# Patient Record
Sex: FEMALE | URBAN - METROPOLITAN AREA
[De-identification: names, ages, dates, MRNs, and addresses within clinical notes are randomized per-mention and may not be internally consistent; named-entity substitution may affect disease eponyms.]

---

## 2017-02-24 ENCOUNTER — THERAPY VISIT (OUTPATIENT)
Dept: PHYSICAL THERAPY | Facility: CLINIC | Age: 57
End: 2017-02-24
Payer: COMMERCIAL

## 2017-02-24 DIAGNOSIS — M25.511 SHOULDER PAIN, RIGHT: ICD-10-CM

## 2017-02-24 DIAGNOSIS — Z98.890 S/P ROTATOR CUFF REPAIR: Primary | ICD-10-CM

## 2017-02-24 PROCEDURE — 97161 PT EVAL LOW COMPLEX 20 MIN: CPT | Mod: GP | Performed by: PHYSICAL THERAPIST

## 2017-02-24 PROCEDURE — 97110 THERAPEUTIC EXERCISES: CPT | Mod: GP | Performed by: PHYSICAL THERAPIST

## 2017-02-24 NOTE — PROGRESS NOTES
[place] [facility] Initial Evaluation    Subjective:    Migue Cerna is a 57 year old female with a right shoulder condition.  Condition occurred with:  Lifting.  Condition occurred: during recreation/sport.  This is a new condition  Patient underwent a R RCR, SAD, DCE performed by Dr. Marshall at  on 1/20/2017. Patient is currently wearing her sling and has been able to sleep in bed with the sling. Has weaned from her pain meds and is using IBprofen for pain. The patient reports she tore her rotator cuff performing a TRX push-up nearly a year ago. States her goal is to bootcamp style classes..    Patient reports pain:  Anterior and lateral.  Radiates to:  Upper arm.  Pain is described as aching and is intermittent and reported as 4/10.  Associated symptoms:  Catching, loss of strength and loss of motion/stiffness. Pain is the same all the time.  Symptoms are exacerbated by other (accidently using arm while getting dressed/ready) and relieved by ice, NSAID's and analgesics.  Since onset symptoms are gradually improving.        General health as reported by patient is good.  Pertinent medical history includes:  Overweight and sleep disorder/apnea.  Medical allergies: no.  Other surgeries include:  Orthopedic surgery (L RCR).  Current medications:  Pain medication.  Current occupation is .    Primary job tasks include:  Prolonged sitting, repetitive tasks and other (computer work).                              Objective:    System                   Shoulder Evaluation:  ROM:  AROM:  : L shoulder AROM: normal, R AROM not tested today                             PROM:    Flexion:  Right: 90      Abduction:  Right:  60    Internal Rotation:  Right:  Wfl @ 55* abd  External Rotation:  Right:  20* @ 55* abd                    Strength:  : R shoulder strength not tested today.  Flexion: Left:5/5   Pain:      Extension:  Left: 5/5    Pain:      Abduction:  Left: 5/5  Pain:         Internal Rotation:  Left:5/5     Pain:      External Rotation:   Left:5/5     Pain:                                                      General     ROS    Assessment/Plan:      Patient is a 57 year old female with right side shoulder complaints.    Patient has the following significant findings with corresponding treatment plan.                Diagnosis 1:  S/p R RCR, DCE, SAD (DOS 1/20/2017)  Pain -  hot/cold therapy, manual therapy, education and home program  Decreased ROM/flexibility - manual therapy and therapeutic exercise  Decreased strength - therapeutic exercise and therapeutic activities  Impaired muscle performance - neuro re-education  Decreased function - therapeutic activities    Therapy Evaluation Codes:   1) History comprised of:   Personal factors that impact the plan of care:      Profession and 2 week delay in beginning PT.    Comorbidity factors that impact the plan of care are:      Overweight and Sleep disorder/apnea.     Medications impacting care: Pain.  2) Examination of Body Systems comprised of:   Body structures and functions that impact the plan of care:      Shoulder.   Activity limitations that impact the plan of care are:      Bathing, Cooking, Driving, Dressing, Lifting, Reading/Computer work and Laying down.  3) Clinical presentation characteristics are:   Stable/Uncomplicated.  4) Decision-Making    Low complexity using standardized patient assessment instrument and/or measureable assessment of functional outcome.  Cumulative Therapy Evaluation is: Low complexity.    Previous and current functional limitations:  (See Goal Flow Sheet for this information)    Short term and Long term goals: (See Goal Flow Sheet for this information)     Communication ability:  Patient appears to be able to clearly communicate and understand verbal and written communication and follow directions correctly.  Treatment Explanation - The following has been discussed with the patient:   RX  ordered/plan of care  Anticipated outcomes  Possible risks and side effects  This patient would benefit from PT intervention to resume normal activities.   Rehab potential is good.    Frequency:  2 X week, once daily  Duration:  for 6 weeks tapering to 1 X a week over 6 weeks  Discharge Plan:  Achieve all LTG.  Independent in home treatment program.  Reach maximal therapeutic benefit.    Please refer to the daily flowsheet for treatment today, total treatment time and time spent performing 1:1 timed codes.

## 2017-02-24 NOTE — LETTER
[place] [facility]  [address]  [address]  [place] [address]  [phone]    2017    Re: Migue Cerna   :   1960  MRN:  [id number]   REFERRING PHYSICIAN:   Ryne Marshall DO    [facility]  Date of Initial Evaluation:  17  Visits:  Rxs Used: 1  Reason for Referral:     S/P rotator cuff repair  Shoulder pain, right    EVALUATION SUMMARY  [facility] Initial Evaluation  Subjective:  Migue Cerna is a 57 year old female with a right shoulder condition.  Condition occurred with:  Lifting.  Condition occurred: during recreation/sport.  This is a new condition  Patient underwent a R RCR, SAD, DCE performed by Dr. Marshall at  on 2017. Patient is currently wearing her sling and has been able to sleep in bed with the sling. Has weaned from her pain meds and is using IBprofen for pain. The patient reports she tore her rotator cuff performing a TRX push-up nearly a year ago. States her goal is to bootcamp style classes..    Patient reports pain:  Anterior and lateral.  Radiates to:  Upper arm.  Pain is described as aching and is intermittent and reported as 4/10.  Associated symptoms:  Catching, loss of strength and loss of motion/stiffness. Pain is the same all the time.  Symptoms are exacerbated by other (accidently using arm while getting dressed/ready) and relieved by ice, NSAID's and analgesics.  Since onset symptoms are gradually improving.        General health as reported by patient is good.  Pertinent medical history includes:  Overweight and sleep disorder/apnea.  Medical allergies: no.  Other surgeries include:  Orthopedic surgery (L RCR).  Current medications:  Pain medication.  Current occupation is .    Primary job tasks include:  Prolonged sitting, repetitive tasks and other (computer work).       Shoulder Evaluation:  ROM:  AROM:  : L shoulder AROM: normal, R  AROM not tested today      PROM:    Flexion:  Right: 90    Abduction:  Right:  60  Internal Rotation:  Right:  Wfl @ 55* abd  External Rotation:  Right:  20* @ 55* abd      Re: Migue Cerna   :   1960    Strength:  : R shoulder strength not tested today.  Flexion: Left:5/5   Pain:      Extension:  Left: 5/5    Pain:      Abduction:  Left: 5/5  Pain:      Internal Rotation:  Left:5/5     Pain:      External Rotation:   Left:5/5     Pain:       Assessment/Plan:    Patient is a 57 year old female with right side shoulder complaints.    Patient has the following significant findings with corresponding treatment plan.                Diagnosis 1:  S/p R RCR, DCE, SAD (DOS 2017)  Pain -  hot/cold therapy, manual therapy, education and home program  Decreased ROM/flexibility - manual therapy and therapeutic exercise  Decreased strength - therapeutic exercise and therapeutic activities  Impaired muscle performance - neuro re-education  Decreased function - therapeutic activities    Therapy Evaluation Codes:   1) History comprised of:   Personal factors that impact the plan of care:      Profession and 2 week delay in beginning PT.    Comorbidity factors that impact the plan of care are:      Overweight and Sleep disorder/apnea.     Medications impacting care: Pain.  2) Examination of Body Systems comprised of:   Body structures and functions that impact the plan of care:      Shoulder.   Activity limitations that impact the plan of care are:      Bathing, Cooking, Driving, Dressing, Lifting, Reading/Computer work and Laying down.  3) Clinical presentation characteristics are:   Stable/Uncomplicated.  4) Decision-Making    Low complexity using standardized patient assessment instrument and/or measureable assessment of functional outcome.  Cumulative Therapy Evaluation is: Low complexity.  Previous and current functional limitations:  (See Goal Flow Sheet for this information)    Short term and Long term goals:  (See Goal Flow Sheet for this information)   Communication ability:  Patient appears to be able to clearly communicate and understand verbal and written communication and follow directions correctly.  Treatment Explanation - The following has been discussed with the patient:   RX ordered/plan of care  Anticipated outcomes  Possible risks and side effects  This patient would benefit from PT intervention to resume normal activities.   Rehab potential is good.    Re: Migue Cenra   :   1960    Frequency:  2 X week, once daily  Duration:  for 6 weeks tapering to 1 X a week over 6 weeks  Discharge Plan:  Achieve all LTG.  Independent in home treatment program.  Reach maximal therapeutic benefit.  Please refer to the daily flowsheet for treatment today, total treatment time and time spent performing 1:1 timed codes.     Thank you for your referral.    INQUIRIES  Therapist: Antonia Cristobal   [facility]  [address] [place] [id number]  Phone: [phone]  Fax: [phone]

## 2017-03-03 ENCOUNTER — THERAPY VISIT (OUTPATIENT)
Dept: PHYSICAL THERAPY | Facility: CLINIC | Age: 57
End: 2017-03-03
Payer: COMMERCIAL

## 2017-03-03 DIAGNOSIS — M25.511 SHOULDER PAIN, RIGHT: ICD-10-CM

## 2017-03-03 DIAGNOSIS — Z98.890 S/P ROTATOR CUFF REPAIR: ICD-10-CM

## 2017-03-03 PROCEDURE — 97110 THERAPEUTIC EXERCISES: CPT | Mod: GP | Performed by: PHYSICAL THERAPIST

## 2017-03-06 ENCOUNTER — THERAPY VISIT (OUTPATIENT)
Dept: PHYSICAL THERAPY | Facility: CLINIC | Age: 57
End: 2017-03-06
Payer: COMMERCIAL

## 2017-03-06 DIAGNOSIS — Z98.890 S/P ROTATOR CUFF REPAIR: ICD-10-CM

## 2017-03-06 DIAGNOSIS — M25.511 SHOULDER PAIN, RIGHT: ICD-10-CM

## 2017-03-06 PROCEDURE — 97110 THERAPEUTIC EXERCISES: CPT | Mod: GP | Performed by: PHYSICAL THERAPIST

## 2017-03-10 ENCOUNTER — THERAPY VISIT (OUTPATIENT)
Dept: PHYSICAL THERAPY | Facility: CLINIC | Age: 57
End: 2017-03-10
Payer: COMMERCIAL

## 2017-03-10 DIAGNOSIS — Z98.890 S/P ROTATOR CUFF REPAIR: ICD-10-CM

## 2017-03-10 DIAGNOSIS — M25.511 SHOULDER PAIN, RIGHT: ICD-10-CM

## 2017-03-10 PROCEDURE — 97110 THERAPEUTIC EXERCISES: CPT | Mod: GP | Performed by: PHYSICAL THERAPIST

## 2017-03-10 NOTE — PROGRESS NOTES
Subjective:    HPI                    Objective:    System    Physical Exam    General     ROS    Assessment/Plan:      PROGRESS  REPORT    Progress reporting period is from 2/24/2017 to 3/10/2017.     4 physical therapy sessions.    SUBJECTIVE  Subjective changes noted by patient: Patient states her arm is a little sore today but she wasn't wearing her sling all day. Got the pulleys on Monday and has been working on them, feels the motion is improving. The other exercises have been going good. Has a follow up with the surgeon on Tuesday.    Current pain level is 7/10.     Previous pain level was  4/10.   Changes in function:  Yes (See Goal flowsheet attached for changes in current functional level)  Adverse reaction to treatment or activity: None    OBJECTIVE  Changes noted in objective findings:  Yes,   Objective:   AROM R Shldr Flx: 122, Abd: 101,   PROM R Shldr Flx: 138, Abd: 125, ER: 65@ 60* abd, IR: 60* @ 60* abd     ASSESSMENT/PLAN  Updated problem list and treatment plan: Diagnosis 1:  s/p R RCR, DCE, SAD   Pain -  hot/cold therapy, manual therapy, education and home program  Decreased ROM/flexibility - manual therapy and therapeutic exercise  Decreased strength - therapeutic exercise and therapeutic activities  Impaired muscle performance - neuro re-education  Decreased function - therapeutic activities  STG/LTGs have been met or progress has been made towards goals:  Yes (See Goal flow sheet completed today.)  Assessment of Progress: The patient's condition is improving despite beginning physical therapy later than ordered.  Patient is meeting short term goals and is progressing towards long term goals.  Self Management Plans:  Patient has been instructed in a home treatment program.  I have re-evaluated this patient and find that the nature, scope, duration and intensity of the therapy is appropriate for the medical condition of the patient.  Migue continues to require the following intervention to meet  STG and LTG's:  PT    Recommendations:  This patient would benefit from continued therapy.     Frequency:  2 X week, once daily  Duration:  for 3 weeks tapering to 1 X a week over 7 weeks        Please refer to the daily flowsheet for treatment today, total treatment time and time spent performing 1:1 timed codes.

## 2017-03-10 NOTE — LETTER
[place] [facility]  [address]  [address]  [place] [address]  [phone]    2017    Re: Migue RODRIGEZ Nehemiah   :   1960  MRN:  [id number]   REFERRING PHYSICIAN:   Ryne Marshall    [place] [facility]  Date of Initial Evaluation:  17  Visits:  Rxs Used: 4  Reason for Referral:     S/P rotator cuff repair  Shoulder pain, right    PROGRESS  REPORT  Progress reporting period is from 2017 to 3/10/2017.     4 physical therapy sessions.    SUBJECTIVE  Subjective changes noted by patient: Patient states her arm is a little sore today but she wasn't wearing her sling all day. Got the pulleys on Monday and has been working on them, feels the motion is improving. The other exercises have been going good. Has a follow up with the surgeon on Tuesday.    Current pain level is 7/10.     Previous pain level was  4/10.   Changes in function:  Yes (See Goal flowsheet attached for changes in current functional level)  Adverse reaction to treatment or activity: None    OBJECTIVE  Changes noted in objective findings:  Yes,   Objective:   AROM R Shldr Flx: 122, Abd: 101,   PROM R Shldr Flx: 138, Abd: 125, ER: 65@ 60* abd, IR: 60* @ 60* abd     ASSESSMENT/PLAN  Updated problem list and treatment plan: Diagnosis 1:  s/p R RCR, DCE, SAD   Pain -  hot/cold therapy, manual therapy, education and home program  Decreased ROM/flexibility - manual therapy and therapeutic exercise  Decreased strength - therapeutic exercise and therapeutic activities  Impaired muscle performance - neuro re-education  Decreased function - therapeutic activities  STG/LTGs have been met or progress has been made towards goals:  Yes (See Goal flow sheet completed today.)        Migue Cerna         Assessment of Progress: The patient's condition is improving despite beginning physical therapy later than ordered.  Patient is meeting short term goals and is progressing towards  long term goals.  Self Management Plans:  Patient has been instructed in a home treatment program.  I have re-evaluated this patient and find that the nature, scope, duration and intensity of the therapy is appropriate for the medical condition of the patient.  Migue continues to require the following intervention to meet STG and LTG's:  PT    Recommendations:  This patient would benefit from continued therapy.     Frequency:  2 X week, once daily  Duration:  for 3 weeks tapering to 1 X a week over 7 weeks    Please refer to the daily flowsheet for treatment today, total treatment time and time spent performing 1:1 timed codes.    Thank you for your referral.    INQUIRIES  Therapist: Antonia Cristobal, PT  [facility]  [address] [place], [place] [id number]  Phone: [phone]  Fax: [phone]

## 2017-03-13 ENCOUNTER — THERAPY VISIT (OUTPATIENT)
Dept: PHYSICAL THERAPY | Facility: CLINIC | Age: 57
End: 2017-03-13
Payer: COMMERCIAL

## 2017-03-13 DIAGNOSIS — M25.511 SHOULDER PAIN, RIGHT: ICD-10-CM

## 2017-03-13 DIAGNOSIS — Z98.890 S/P ROTATOR CUFF REPAIR: ICD-10-CM

## 2017-03-13 PROCEDURE — 97110 THERAPEUTIC EXERCISES: CPT | Mod: GP | Performed by: PHYSICAL THERAPIST

## 2017-03-16 ENCOUNTER — THERAPY VISIT (OUTPATIENT)
Dept: PHYSICAL THERAPY | Facility: CLINIC | Age: 57
End: 2017-03-16
Payer: COMMERCIAL

## 2017-03-16 DIAGNOSIS — M25.511 SHOULDER PAIN, RIGHT: ICD-10-CM

## 2017-03-16 DIAGNOSIS — Z98.890 S/P ROTATOR CUFF REPAIR: ICD-10-CM

## 2017-03-16 PROCEDURE — 97110 THERAPEUTIC EXERCISES: CPT | Mod: GP | Performed by: PHYSICAL THERAPIST

## 2017-03-23 ENCOUNTER — THERAPY VISIT (OUTPATIENT)
Dept: PHYSICAL THERAPY | Facility: CLINIC | Age: 57
End: 2017-03-23
Payer: COMMERCIAL

## 2017-03-23 DIAGNOSIS — M25.511 SHOULDER PAIN, RIGHT: ICD-10-CM

## 2017-03-23 DIAGNOSIS — Z98.890 S/P ROTATOR CUFF REPAIR: ICD-10-CM

## 2017-03-23 PROCEDURE — 97110 THERAPEUTIC EXERCISES: CPT | Mod: GP | Performed by: PHYSICAL THERAPIST

## 2017-03-27 ENCOUNTER — THERAPY VISIT (OUTPATIENT)
Dept: PHYSICAL THERAPY | Facility: CLINIC | Age: 57
End: 2017-03-27
Payer: COMMERCIAL

## 2017-03-27 DIAGNOSIS — M25.511 SHOULDER PAIN, RIGHT: ICD-10-CM

## 2017-03-27 DIAGNOSIS — Z98.890 S/P ROTATOR CUFF REPAIR: ICD-10-CM

## 2017-03-27 PROCEDURE — 97110 THERAPEUTIC EXERCISES: CPT | Mod: GP | Performed by: PHYSICAL THERAPIST

## 2017-03-27 PROCEDURE — 97112 NEUROMUSCULAR REEDUCATION: CPT | Mod: GP | Performed by: PHYSICAL THERAPIST

## 2017-03-30 ENCOUNTER — THERAPY VISIT (OUTPATIENT)
Dept: PHYSICAL THERAPY | Facility: CLINIC | Age: 57
End: 2017-03-30
Payer: COMMERCIAL

## 2017-03-30 DIAGNOSIS — Z98.890 S/P ROTATOR CUFF REPAIR: ICD-10-CM

## 2017-03-30 DIAGNOSIS — M25.511 SHOULDER PAIN, RIGHT: ICD-10-CM

## 2017-03-30 PROCEDURE — 97112 NEUROMUSCULAR REEDUCATION: CPT | Mod: GP | Performed by: PHYSICAL THERAPIST

## 2017-03-30 PROCEDURE — 97110 THERAPEUTIC EXERCISES: CPT | Mod: GP | Performed by: PHYSICAL THERAPIST

## 2017-04-07 ENCOUNTER — THERAPY VISIT (OUTPATIENT)
Dept: PHYSICAL THERAPY | Facility: CLINIC | Age: 57
End: 2017-04-07
Payer: COMMERCIAL

## 2017-04-07 DIAGNOSIS — Z98.890 S/P ROTATOR CUFF REPAIR: ICD-10-CM

## 2017-04-07 DIAGNOSIS — M25.511 SHOULDER PAIN, RIGHT: ICD-10-CM

## 2017-04-07 PROCEDURE — 97110 THERAPEUTIC EXERCISES: CPT | Mod: GP | Performed by: PHYSICAL THERAPIST

## 2017-04-07 PROCEDURE — 97112 NEUROMUSCULAR REEDUCATION: CPT | Mod: GP | Performed by: PHYSICAL THERAPIST

## 2017-04-21 ENCOUNTER — THERAPY VISIT (OUTPATIENT)
Dept: PHYSICAL THERAPY | Facility: CLINIC | Age: 57
End: 2017-04-21
Payer: COMMERCIAL

## 2017-04-21 DIAGNOSIS — M25.511 SHOULDER PAIN, RIGHT: ICD-10-CM

## 2017-04-21 DIAGNOSIS — Z98.890 S/P ROTATOR CUFF REPAIR: ICD-10-CM

## 2017-04-21 PROCEDURE — 97112 NEUROMUSCULAR REEDUCATION: CPT | Mod: GP | Performed by: PHYSICAL THERAPIST

## 2017-04-21 PROCEDURE — 97110 THERAPEUTIC EXERCISES: CPT | Mod: GP | Performed by: PHYSICAL THERAPIST

## 2017-04-28 ENCOUNTER — THERAPY VISIT (OUTPATIENT)
Dept: PHYSICAL THERAPY | Facility: CLINIC | Age: 57
End: 2017-04-28
Payer: COMMERCIAL

## 2017-04-28 DIAGNOSIS — M25.511 SHOULDER PAIN, RIGHT: ICD-10-CM

## 2017-04-28 DIAGNOSIS — Z98.890 S/P ROTATOR CUFF REPAIR: ICD-10-CM

## 2017-04-28 PROCEDURE — 97110 THERAPEUTIC EXERCISES: CPT | Mod: GP | Performed by: PHYSICAL THERAPIST

## 2017-04-28 PROCEDURE — 97112 NEUROMUSCULAR REEDUCATION: CPT | Mod: GP | Performed by: PHYSICAL THERAPIST

## 2017-04-28 NOTE — LETTER
[place] [facility]  [address]  [address]  [place] [address]  [phone]    May 1, 2017    Re: Migue Cerna   :   1960  MRN:  [id number]   REFERRING PHYSICIAN:   Ryne Marshall    [place] [facility]    Date of Initial Evaluation:  2017  Visits:  Rxs Used: 12  Reason for Referral:     S/P rotator cuff repair  Shoulder pain, right      PROGRESS  REPORT  Progress reporting period is from 3/10/2017 to 2017.       SUBJECTIVE  Subjective changes noted by patient:  Patient states her R shoulder has been more achey/sore since beginning the strengthening exercises, this is a different kind of sore, more of a muscle fatigue. Feels the motion has been good and the arm feels stronger, able to open doors easier now.  Current pain level is 2/10.   Previous pain level was  4/10. Changes in function:  Yes (See Goal flowsheet attached for changes in current functional level).  Adverse reaction to treatment or activity: None    OBJECTIVE  Changes noted in objective findings:  Yes,   Objective:   AROM R Shldr Flx: 162, Abd: 164, Flx/ER: T4, Ext/IR: T8;   Strength R Shldr Flx: 4/5, Abd: -4/5, Ext: 5/5, IR: 5/5, ER: 4/5     ASSESSMENT/PLAN  Updated problem list and treatment plan: Diagnosis 1:  s/p R RCR, DCE, SAD   Pain -  hot/cold therapy, manual therapy, education and home program  Decreased ROM/flexibility - manual therapy and therapeutic exercise  Decreased strength - therapeutic exercise and therapeutic activities  Impaired muscle performance - neuro re-education  Decreased function - therapeutic activities  STG/LTGs have been met or progress has been made towards goals:  Yes (See Goal flow sheet completed today.)  Assessment of Progress: The patient's condition is improving.  The patient's condition has potential to improve.  Patient is meeting short term goals and is progressing towards long term goals.  Self Management Plans:  Patient has been  instructed in a home treatment program.  I have re-evaluated this patient and find that the nature, scope, duration and intensity of the therapy is appropriate for the medical condition of the patient.  Migue continues to require the following intervention to meet STG and LTG's:  PT    Recommendations:  This patient would benefit from continued therapy.     Frequency:  1 X week, once daily  Duration:  for 2 weeks tapering to 2 X a month over 4 weeks    Thank you for your referral.    INQUIRIES  Therapist: Antonia Cristobal PT  [facility]  [address] [place] [address]  Phone: [phone]  Fax: [phone]

## 2017-04-28 NOTE — PROGRESS NOTES
Subjective:    HPI                    Objective:    System    Physical Exam    General     ROS    Assessment/Plan:      PROGRESS  REPORT    Progress reporting period is from 3/10/2017 to 4/28/2017.       SUBJECTIVE  Subjective changes noted by patient:  Patient states her R shoulder has been more achey/sore since beginning the strengthening exercises, this is a different kind of sore, more of a muscle fatigue. Feels the motion has been good and the arm feels stronger, able to open doors easier now.    Current pain level is 2/10.     Previous pain level was  4/10.   Changes in function:  Yes (See Goal flowsheet attached for changes in current functional level)  Adverse reaction to treatment or activity: None    OBJECTIVE  Changes noted in objective findings:  Yes,   Objective:   AROM R Shldr Flx: 162, Abd: 164, Flx/ER: T4, Ext/IR: T8;   Strength R Shldr Flx: 4/5, Abd: -4/5, Ext: 5/5, IR: 5/5, ER: 4/5     ASSESSMENT/PLAN  Updated problem list and treatment plan: Diagnosis 1:  s/p R RCR, DCE, SAD   Pain -  hot/cold therapy, manual therapy, education and home program  Decreased ROM/flexibility - manual therapy and therapeutic exercise  Decreased strength - therapeutic exercise and therapeutic activities  Impaired muscle performance - neuro re-education  Decreased function - therapeutic activities  STG/LTGs have been met or progress has been made towards goals:  Yes (See Goal flow sheet completed today.)  Assessment of Progress: The patient's condition is improving.  The patient's condition has potential to improve.  Patient is meeting short term goals and is progressing towards long term goals.  Self Management Plans:  Patient has been instructed in a home treatment program.  I have re-evaluated this patient and find that the nature, scope, duration and intensity of the therapy is appropriate for the medical condition of the patient.  Migue continues to require the following intervention to meet STG and LTG's:   PT    Recommendations:  This patient would benefit from continued therapy.     Frequency:  1 X week, once daily  Duration:  for 2 weeks tapering to 2 X a month over 4 weeks        Please refer to the daily flowsheet for treatment today, total treatment time and time spent performing 1:1 timed codes.

## 2017-05-12 ENCOUNTER — THERAPY VISIT (OUTPATIENT)
Dept: PHYSICAL THERAPY | Facility: CLINIC | Age: 57
End: 2017-05-12
Payer: COMMERCIAL

## 2017-05-12 DIAGNOSIS — M25.511 SHOULDER PAIN, RIGHT: ICD-10-CM

## 2017-05-12 DIAGNOSIS — Z98.890 S/P ROTATOR CUFF REPAIR: ICD-10-CM

## 2017-05-12 PROCEDURE — 97112 NEUROMUSCULAR REEDUCATION: CPT | Mod: GP | Performed by: PHYSICAL THERAPIST

## 2017-05-12 PROCEDURE — 97110 THERAPEUTIC EXERCISES: CPT | Mod: GP | Performed by: PHYSICAL THERAPIST

## 2017-05-19 ENCOUNTER — THERAPY VISIT (OUTPATIENT)
Dept: PHYSICAL THERAPY | Facility: CLINIC | Age: 57
End: 2017-05-19
Payer: COMMERCIAL

## 2017-05-19 DIAGNOSIS — Z98.890 S/P ROTATOR CUFF REPAIR: ICD-10-CM

## 2017-05-19 DIAGNOSIS — M25.511 SHOULDER PAIN, RIGHT: ICD-10-CM

## 2017-05-19 PROCEDURE — 97112 NEUROMUSCULAR REEDUCATION: CPT | Mod: GP | Performed by: PHYSICAL THERAPIST

## 2017-05-19 PROCEDURE — 97110 THERAPEUTIC EXERCISES: CPT | Mod: GP | Performed by: PHYSICAL THERAPIST

## 2017-06-30 ENCOUNTER — THERAPY VISIT (OUTPATIENT)
Dept: PHYSICAL THERAPY | Facility: CLINIC | Age: 57
End: 2017-06-30
Payer: COMMERCIAL

## 2017-06-30 DIAGNOSIS — Z98.890 S/P ROTATOR CUFF REPAIR: ICD-10-CM

## 2017-06-30 DIAGNOSIS — M25.511 SHOULDER PAIN, RIGHT: ICD-10-CM

## 2017-06-30 PROCEDURE — 97110 THERAPEUTIC EXERCISES: CPT | Mod: GP | Performed by: PHYSICAL THERAPIST

## 2017-06-30 PROCEDURE — 97112 NEUROMUSCULAR REEDUCATION: CPT | Mod: GP | Performed by: PHYSICAL THERAPIST

## 2017-06-30 NOTE — LETTER
[place] [facility]  [address]  [address]  [place] [address]  [phone]    2017    Re: Migue Cerna   :   1960  MRN:  [id number]   REFERRING PHYSICIAN:   Ryne Marshall    [place] [facility]    Date of Initial Evaluation:  17  Visits:  Rxs Used: 15  Reason for Referral:     S/P rotator cuff repair  Shoulder pain, right    PROGRESS  REPORT  Progress reporting period is from 2017 to 2017.       SUBJECTIVE  Subjective changes noted by patient:  Shoulder had been doing really well until she helped build a swingset and raking mulch a couple of weeks ago. This caused an increase in pain and it has been sore since. Reports she has also gotten into a funk and hadn't been performing her exercises, has started back up again.    Current pain level is 2/10.     Previous pain level was  4/10.   Changes in function:  Yes (See Goal flowsheet attached for changes in current functional level)  Adverse reaction to treatment or activity: None    OBJECTIVE  Changes noted in objective findings:  Yes,   Objective:   AROM R Shldr Flx: 172, Abd: 151, Flx/ER: T4, Ext/IR: T9;   Strength R Shldr Flx: 3/5, Abd: 3/5, Ext: +4/5, IR: +4/5, ER: -4/5     ASSESSMENT/PLAN  Updated problem list and treatment plan: Diagnosis 1:  s/p R RCR, DCE, SAD  Pain -  hot/cold therapy, manual therapy, education and home program  Decreased ROM/flexibility - manual therapy and therapeutic exercise  Decreased strength - therapeutic exercise and therapeutic activities  Impaired muscle performance - neuro re-education  Decreased function - therapeutic activities  STG/LTGs have been met or progress has been made towards goals:  Yes (See Goal flow sheet completed today.)  Assessment of Progress: The patient's progress has slowed, as patient was not consistent with her exercises the last 1.5 months.  Patient is meeting short term goals and is progressing towards long term  goals.  Self Management Plans:  Patient has been instructed in a home treatment program.  Re: Migue Cerna   :   1960    I have re-evaluated this patient and find that the nature, scope, duration and intensity of the therapy is appropriate for the medical condition of the patient.  Migue continues to require the following intervention to meet STG and LTG's:  PT    Recommendations:  This patient would benefit from continued therapy.     Frequency:  2 X a month, once daily  Duration:  for 2 months    Thank you for your referral.    INQUIRIES  Therapist: Antonia Cristobal PT  [facility]  [address] [place] [id number]  Phone: [phone]  Fax: [phone]

## 2017-06-30 NOTE — PROGRESS NOTES
Subjective:    HPI                    Objective:    System    Physical Exam    General     ROS    Assessment/Plan:      PROGRESS  REPORT    Progress reporting period is from 4/28/2017 to 6/30/2017.       SUBJECTIVE  Subjective changes noted by patient:  Shoulder had been doing really well until she helped build a swingset and raking mulch a couple of weeks ago. This caused an increase in pain and it has been sore since. Reports she has also gotten into a funk and hadn't been performing her exercises, has started back up again.    Current pain level is 2/10.     Previous pain level was  4/10.   Changes in function:  Yes (See Goal flowsheet attached for changes in current functional level)  Adverse reaction to treatment or activity: None    OBJECTIVE  Changes noted in objective findings:  Yes,   Objective:   AROM R Shldr Flx: 172, Abd: 151, Flx/ER: T4, Ext/IR: T9;   Strength R Shldr Flx: 3/5, Abd: 3/5, Ext: +4/5, IR: +4/5, ER: -4/5     ASSESSMENT/PLAN  Updated problem list and treatment plan: Diagnosis 1:  s/p R RCR, DCE, SAD  Pain -  hot/cold therapy, manual therapy, education and home program  Decreased ROM/flexibility - manual therapy and therapeutic exercise  Decreased strength - therapeutic exercise and therapeutic activities  Impaired muscle performance - neuro re-education  Decreased function - therapeutic activities  STG/LTGs have been met or progress has been made towards goals:  Yes (See Goal flow sheet completed today.)  Assessment of Progress: The patient's progress has slowed, as patient was not consistent with her exercises the last 1.5 months.  Patient is meeting short term goals and is progressing towards long term goals.  Self Management Plans:  Patient has been instructed in a home treatment program.  I have re-evaluated this patient and find that the nature, scope, duration and intensity of the therapy is appropriate for the medical condition of the patient.  Migue continues to require the  following intervention to meet STG and LTG's:  PT    Recommendations:  This patient would benefit from continued therapy.     Frequency:  2 X a month, once daily  Duration:  for 2 months        Please refer to the daily flowsheet for treatment today, total treatment time and time spent performing 1:1 timed codes.

## 2017-10-06 PROBLEM — M25.511 SHOULDER PAIN, RIGHT: Status: RESOLVED | Noted: 2017-02-24 | Resolved: 2017-10-06

## 2017-10-06 PROBLEM — Z98.890 S/P ROTATOR CUFF REPAIR: Status: RESOLVED | Noted: 2017-02-24 | Resolved: 2017-10-06

## 2017-10-06 NOTE — PROGRESS NOTES
Discharge Note    Progress reporting period is from initial eval to Jun 30, 2017.     Migue failed to return for next follow up visit and current status is unknown.  Please see information below for last relevant information on current status.  Patient seen for 15 visits.  SUBJECTIVE  Subjective changes noted by patient: Shoulder had been doing really well until she helped build a swingset and raking mulch a couple of weeks ago. This caused an increase in pain and it has been sore since. Reports she has also gotten into a funk and hadn't been performing her exercises, has started back up again.  Current pain level is 2/10.     Previous pain level was  4/10.   Changes in function:  Yes (See Goal flowsheet attached for changes in current functional level)  Adverse reaction to treatment or activity: None    OBJECTIVE  Changes noted in objective findings: AROM R Shldr Flx: 172, Abd: 151, Flx/ER: T4, Ext/IR: T9; Strength R Shldr Flx: 3/5, Abd: 3/5, Ext: +4/5, IR: +4/5, ER: -4/5     ASSESSMENT/PLAN  Diagnosis: s/p R RCR, DCE, SAD    DIAGP:  Diagnoses of S/P rotator cuff repair and Shoulder pain, right were pertinent to this visit.  Updated problem list and treatment plan:   Pain - HEP  Decreased ROM/flexibility - HEP  Decreased function - HEP  Decreased strength - HEP  Impaired muscle performance - HEP  STG/LTGs have been met or progress has been made towards goals:  Yes, please see goal flowsheet for most current information  Assessment of Progress: current status is unknown.    Last current status:     Self Management Plans:  HEP  I have re-evaluated this patient and find that the nature, scope, duration and intensity of the therapy is appropriate for the medical condition of the patient.  Migue continues to require the following intervention to meet STG and LTG's:  HEP.    Recommendations:  Discharge with current home program.  Patient to follow up with MD as needed.    Please refer to the daily flowsheet for  treatment today, total treatment time and time spent performing 1:1 timed codes.

## 2017-12-11 ENCOUNTER — HOSPITAL ENCOUNTER (EMERGENCY)
Facility: CLINIC | Age: 57
Discharge: HOME | End: 2017-12-11
Attending: EMERGENCY MEDICINE | Admitting: EMERGENCY MEDICINE
Payer: COMMERCIAL

## 2017-12-11 ENCOUNTER — APPOINTMENT (OUTPATIENT)
Dept: GENERAL RADIOLOGY | Facility: CLINIC | Age: 57
End: 2017-12-11
Attending: EMERGENCY MEDICINE
Payer: COMMERCIAL

## 2017-12-11 VITALS
OXYGEN SATURATION: 98 % | RESPIRATION RATE: 13 BRPM | TEMPERATURE: 98.5 F | WEIGHT: 192 LBS | HEIGHT: 61 IN | SYSTOLIC BLOOD PRESSURE: 150 MMHG | DIASTOLIC BLOOD PRESSURE: 104 MMHG | BODY MASS INDEX: 36.25 KG/M2

## 2017-12-11 DIAGNOSIS — R07.9 CHEST PAIN, UNSPECIFIED TYPE: ICD-10-CM

## 2017-12-11 LAB
ANION GAP SERPL CALCULATED.3IONS-SCNC: 6 MMOL/L (ref 3–14)
BASOPHILS # BLD AUTO: 0 10E9/L (ref 0–0.2)
BASOPHILS NFR BLD AUTO: 0.2 %
BUN SERPL-MCNC: 12 MG/DL (ref 7–30)
CALCIUM SERPL-MCNC: 8.7 MG/DL (ref 8.5–10.1)
CHLORIDE SERPL-SCNC: 107 MMOL/L (ref 94–109)
CO2 SERPL-SCNC: 27 MMOL/L (ref 20–32)
CREAT SERPL-MCNC: 0.64 MG/DL (ref 0.52–1.04)
DIFFERENTIAL METHOD BLD: NORMAL
EOSINOPHIL # BLD AUTO: 0 10E9/L (ref 0–0.7)
EOSINOPHIL NFR BLD AUTO: 0.5 %
ERYTHROCYTE [DISTWIDTH] IN BLOOD BY AUTOMATED COUNT: 13.9 % (ref 10–15)
GFR SERPL CREATININE-BSD FRML MDRD: >90 ML/MIN/1.7M2
GLUCOSE SERPL-MCNC: 82 MG/DL (ref 70–99)
HCT VFR BLD AUTO: 37.6 % (ref 35–47)
HGB BLD-MCNC: 12.2 G/DL (ref 11.7–15.7)
IMM GRANULOCYTES # BLD: 0 10E9/L (ref 0–0.4)
IMM GRANULOCYTES NFR BLD: 0 %
INTERPRETATION ECG - MUSE: NORMAL
INTERPRETATION ECG - MUSE: NORMAL
LYMPHOCYTES # BLD AUTO: 2 10E9/L (ref 0.8–5.3)
LYMPHOCYTES NFR BLD AUTO: 47.9 %
MCH RBC QN AUTO: 27.4 PG (ref 26.5–33)
MCHC RBC AUTO-ENTMCNC: 32.4 G/DL (ref 31.5–36.5)
MCV RBC AUTO: 84 FL (ref 78–100)
MONOCYTES # BLD AUTO: 0.3 10E9/L (ref 0–1.3)
MONOCYTES NFR BLD AUTO: 6.4 %
NEUTROPHILS # BLD AUTO: 1.9 10E9/L (ref 1.6–8.3)
NEUTROPHILS NFR BLD AUTO: 45 %
NRBC # BLD AUTO: 0 10*3/UL
NRBC BLD AUTO-RTO: 0 /100
PLATELET # BLD AUTO: 227 10E9/L (ref 150–450)
POTASSIUM SERPL-SCNC: 4 MMOL/L (ref 3.4–5.3)
RBC # BLD AUTO: 4.46 10E12/L (ref 3.8–5.2)
SODIUM SERPL-SCNC: 140 MMOL/L (ref 133–144)
TROPONIN I SERPL-MCNC: <0.015 UG/L (ref 0–0.04)
TROPONIN I SERPL-MCNC: <0.015 UG/L (ref 0–0.04)
WBC # BLD AUTO: 4.2 10E9/L (ref 4–11)

## 2017-12-11 PROCEDURE — 80048 BASIC METABOLIC PNL TOTAL CA: CPT | Performed by: EMERGENCY MEDICINE

## 2017-12-11 PROCEDURE — 84484 ASSAY OF TROPONIN QUANT: CPT | Performed by: EMERGENCY MEDICINE

## 2017-12-11 PROCEDURE — 25000132 ZZH RX MED GY IP 250 OP 250 PS 637: Performed by: EMERGENCY MEDICINE

## 2017-12-11 PROCEDURE — 99285 EMERGENCY DEPT VISIT HI MDM: CPT | Mod: 25

## 2017-12-11 PROCEDURE — 93005 ELECTROCARDIOGRAM TRACING: CPT

## 2017-12-11 PROCEDURE — 71020 XR CHEST 2 VW: CPT

## 2017-12-11 PROCEDURE — 93005 ELECTROCARDIOGRAM TRACING: CPT | Mod: 76

## 2017-12-11 PROCEDURE — 85025 COMPLETE CBC W/AUTO DIFF WBC: CPT | Performed by: EMERGENCY MEDICINE

## 2017-12-11 RX ORDER — ASPIRIN 325 MG
325 TABLET ORAL ONCE
Status: COMPLETED | OUTPATIENT
Start: 2017-12-11 | End: 2017-12-11

## 2017-12-11 RX ADMIN — ASPIRIN 325 MG ORAL TABLET 325 MG: 325 PILL ORAL at 10:26

## 2017-12-11 ASSESSMENT — ENCOUNTER SYMPTOMS
VOMITING: 0
COUGH: 1
RHINORRHEA: 0
DIARRHEA: 0
BACK PAIN: 1

## 2017-12-11 NOTE — ED PROVIDER NOTES
History     Chief Complaint:  Chest Pain      HPI   Migue Cerna is a 57 year old female who presents to the emergency department today for evaluation of chest pain. Last night, the patient made dinner and cleaned her kitchen then went to bed at 2000. Today, patient was sitting at her desk at work when she had a sudden onset of right sided chest pain. She has had similar chest pain in the past few weeks however pain today was more severe prompting ED visit. Here, she notes pain resolved after 4-5 seconds. She is chest pain free here. Pain radiates to back, sharp in nature, constant when present and then self abated.  Previous episodes of chest pain felt like heart burn. No chest pain with exertion however patient admits to being more sedentary recently. No vomiting. No diarrhea. No skin changes. Patient has had upper respiratory symptoms including cough. No rhinorrhea. No other concerns voiced.     Cardiac/PE/DVT Risk Factors:  History of hypertension - NEGATIVE   History of hyperlipidemia - NEGATIVE   History of diabetes - NEGATIVE   History of smoking - NEGATIVE   Personal history of PE/DVT - NEGATIVE   Family history of PE/DVT - NEGATIVE   Family history of heart complications -   Recent travel - NEGATIVE   Recent surgery - NEGATIVE   Other immobilizations - NEGATIVE   Cancer - NEGATIVE     Allergies:  Sulfasalazine    Medications:    The patient is currently on no regular medications.    Past Medical History:    Chronic anemia   Sleep apnea     Past Surgical History:    ESOPHAGOSCOPY, GASTROSCOPY, DUODENOSCOPY (EGD), COMBINED   GYN SURGERY   LAPAROSCOPIC GASTRIC SLEEVE   ORTHOPEDIC SURGERY     Family History:    History reviewed. No pertinent family history.     Social History:  The patient presents alone  Smoking Status: Never smoker  Smokeless Tobacco: No  Alcohol Use: No  Marital Status:  Single [1]     Review of Systems   HENT: Negative for rhinorrhea.    Respiratory: Positive for cough.   "  Cardiovascular: Positive for chest pain.   Gastrointestinal: Negative for diarrhea and vomiting.   Musculoskeletal: Positive for back pain.   All other systems reviewed and are negative.    Physical Exam   Vitals:  Patient Vitals for the past 24 hrs:   BP Temp Temp src Heart Rate Resp SpO2 Height Weight   12/11/17 1002 152/89 98.5  F (36.9  C) Oral 60 14 99 % 1.549 m (5' 1\") 87.1 kg (192 lb)     Physical Exam  Vitals: reviewed by me  General: Pt seen on [facility], pleasant, cooperative, and alert to conversation, non diaphoretic.   Eyes: Tracking well, clear conj BL  ENT: MMM, O/P clear of lesions  Neck: Midline trachea, FROM to neck  Lungs:   No tachypnea, no accessory m use, speaking in full sentences.  CV: Regular rate with no JVD  Abd: Soft, non tender, no guarding, no rebound. Non distended  Extremities: no peripheral edema or joint effusion  Skin: No rash, normal turgor and temperature  Neuro: Clear speech and no facial droop.  Psych: Not RIS, no e/o AH/VH        Emergency Department Course     ECG:  ECG taken at 0954, ECG read at 1001  Norm al sinus rhythm   Nonspecific T weave abnormality  Abnormal ECG  Rate 60 bpm. TN interval 170. QRS duration 88. QT/QTc 424/424. P-R-T axes 48 -10 -4.    ECG taken at 1233, ECG read at 1237  Normal sinus rhythm  Minimal voltage criteria for LVH, may be normal variant   T wave abnormality, consider lateral ischemia   Abnormal ECG  Rate 65 bpm. TN interval 176. QRS duration 88. QT/QTc 442/459. P-R-T axes 50 -14 -3.    Imaging:  Radiology findings were communicated with the patient who voiced understanding of the findings.    Chest XR, 2 Views:  No acute cardiopulmonary abnormality.  Reading per radiology    Laboratory:  Laboratory findings were communicated with the patient who voiced understanding of the findings.    CBC:AWNL. (WBC 4.2, HGB 12.2, )     basic metabolic panel: AWNL (Creatinine: 0.64)    Troponin (Collected 1022): <0.015    Troponin (Collected " 1253): <0.015    Interventions:  1026- Asprin 325 mg Oral        Emergency Department Course:  Nursing notes and vitals reviewed.  I performed an exam of the patient as documented above.     IV was inserted and blood was drawn for laboratory testing, results above.    The patient was sent for a XR while in the emergency department, results above.     I discussed the treatment plan with the patient. They expressed understanding of this plan and consented to discharge.     Impression & Plan      Medical Decision Making:  Migue Cerna is a 57 year old female with no known significant cardiovascular history presents with chest pain, unclear etiology at this point. The chest pain is no typical for ACS, non exertional, and not tied to any specific activities, nor is it better with rest. No pleuritic component, and patient has no history of blood clots and is not taking any medicine that would increase this like estrogen. Patient otherwise well appearing here with negative EKG, negative troponin X2, though I do appreciate nonspecific T wave changes. Patient does not appear to have any anginal symptoms. With this in mind, I have given Asprin and offered abundance of precaution symptoms to watch for, and do feel comfortable discharging patient to home to follow up with PCP in the next 3-4 days for outpatient stress test. Patient and daughter in the room state they feel comfortable doing this, and we have all gone over the clear return ED precautions should pain change or become exertional. I see no evidence of pericarditis, pneumothorax, dissection, PE or ACS throughout today's workup and I will discharge as above.       Diagnosis:    ICD-10-CM    1. Chest pain, unspecified type R07.9          Disposition:  Discharge       Scribe Disclosure:  SANDRA, Alcon Cabezas, am serving as a scribe at 9:57 AM on 12/11/2017 to document services personally performed by Sanjeev Lopez MD based on my observations and the provider's  statements to me.      12/11/2017    EMERGENCY DEPARTMENT       Sanjeev Lopez MD  12/11/17 2278

## 2017-12-11 NOTE — ED AVS SNAPSHOT
Emergency Department    [address] [facility] [address]    Phone:  [phone]    Fax:  [phone]                                       Migue Cerna   MRN: [id number]    Department:   Emergency Department   Date of Visit:  12/11/2017           Patient Information     Date Of Birth          1960        Your diagnoses for this visit were:     Chest pain, unspecified type        You were seen by Sanjeev Lopez MD.      Follow-up Information     Follow up with Malini Sorto MD. Schedule an appointment as soon as possible for a visit in 3 days.    Specialty:  Internal Medicine    Why:  For repeat evaluation and symptom check, you also will likely need a Cardiac Stress Test, so be sure to ask.      Contact information:    [address] [address] [phone]          Follow up with  Emergency Department.    Specialty:  EMERGENCY MEDICINE    Why:  If symptoms worsen    Contact information:    [address] [facility] [address] [phone]        Discharge Instructions          *CHEST PAIN, UNCERTAIN CAUSE    Based on your exam today, the exact cause of your chest pain is not certain. Your condition does not seem serious at this time, and your pain does not appear to be coming from your heart. However, sometimes the signs of a serious problem take more time to appear. Therefore, watch for the warning signs listed below.  HOME CARE:  1. Rest today and avoid strenuous activity.  2. Take any prescribed medicine as directed.  FOLLOW UP with your doctor in 1-3 days.   GET PROMPT MEDICAL ATTENTION if any of the following occur:    A change in the type of pain: if it feels different, becomes more severe, lasts longer, or begins to spread into your shoulder, arm, neck, jaw or back    Shortness of breath or increased pain with breathing    Weakness, dizziness, or fainting    Cough with blood or dark  colored sputum (phlegm)    Fever over 101  F (38.3  C)    Swelling, pain or redness in one leg    1533-4664 [employer]. [address] [place] [address], [place] [address]. All rights reserved. This information is not intended as a substitute for professional medical care. Always follow your healthcare professional's instructions.  This information has been modified by your health care provider with permission from the publisher.    Discharge Instructions  Chest Pain    You have been seen today for chest pain or discomfort.  At this time, your doctor has found no signs that your chest pain is due to a serious or life-threatening condition, (or you have declined more testing and/or admission to the hospital). However, sometimes there is a serious problem that does not show up right away. Your evaluation today may not be complete and you may need further testing and evaluation.     You need to follow-up with your regular doctor within 3 days.    Return to the Emergency Department if:    Your chest pain changes, gets worse, starts to happen more often, or comes with less activity.    You are short of breath.    You get very weak or tired.    You pass out or faint.    You have any new symptoms, like fever, cough, numb legs, or you cough up blood.    You have anything else that worries you.    Until you follow-up with your regular doctor please do the following:    Take one aspirin daily unless you have an allergy or are told not to by your doctor.    If a stress test appointment has been made, go to the appointment.    If you have questions, contact your regular doctor.    If your doctor today has told you to follow-up with your regular doctor, it is very important that you make an appointment with your clinic and go to the appointment.  If you do not follow-up with your primary doctor, it may result in missing an important development which could result in permanent injury or disability and/or lasting pain.  If  there is any problem keeping your appointment, call your doctor or return to the Emergency Department.    If you were given a prescription for medicine here today, be sure to read all of the information (including the package insert) that comes with your prescription.  This will include important information about the medicine, its side effects, and any warnings that you need to know about.  The pharmacist who fills the prescription can provide more information and answer questions you may have about the medicine.  If you have questions or concerns that the pharmacist cannot address, please call or return to the Emergency Department.     Opioid Medication Information    Pain medications are among the most commonly prescribed medicines, so we are including this information for all our patients. If you did not receive pain medication or get a prescription for pain medicine, you can ignore it.     You may have been given a prescription for an opioid (narcotic) pain medicine and/or have received a pain medicine while here in the Emergency Department. These medicines can make you drowsy or impaired. You must not drive, operate dangerous equipment, or engage in any other dangerous activities while taking these medications. If you drive while taking these medications, you could be arrested for DUI, or driving under the influence. Do not drink any alcohol while you are taking these medications.     Opioid pain medications can cause addiction. If you have a history of chemical dependency of any type, you are at a higher risk of becoming addicted to pain medications.  Only take these prescribed medications to treat your pain when all other options have been tried. Take it for as short a time and as few doses as possible. Store your pain pills in a secure place, as they are frequently stolen and provide a dangerous opportunity for children or visitors in your house to start abusing these powerful medications. We will not  replace any lost or stolen medicine.  As soon as your pain is better, you should flush all your remaining medication.     Many prescription pain medications contain Tylenol  (acetaminophen), including Vicodin , Tylenol #3 , Norco , Lortab , and Percocet .  You should not take any extra pills of Tylenol  if you are using these prescription medications or you can get very sick.  Do not ever take more than 3000 mg of acetaminophen in any 24 hour period.    All opioids tend to cause constipation. Drink plenty of water and eat foods that have a lot of fiber, such as fruits, vegetables, prune juice, apple juice and high fiber cereal.  Take a laxative if you don t move your bowels at least every other day. Miralax , Milk of Magnesia, Colace , or Senna  can be used to keep you regular.      Remember that you can always come back to the Emergency Department if you are not able to see your regular doctor in the amount of time listed above, if you get any new symptoms, or if there is anything that worries you.          24 Hour Appointment Hotline       To make an appointment at any [facility] (formerly [facility]3], call [phone] ([phone]). If you don't have a family doctor or clinic, we will help you find one. [facility] clinics are conveniently located to serve the needs of you and your family.             Review of your medicines      Our records show that you are taking the medicines listed below. If these are incorrect, please call your family doctor or clinic.        Dose / Directions Last dose taken    CALCIUM + D + K PO        Take  by mouth daily.   Refills:  0        DAILY MULTIVITAMIN PO        Take  by mouth daily.   Refills:  0        FOLIC ACID PO   Dose:  2 tablet        Take 2 tablets by mouth 2 times daily 500mg   Refills:  0        PENTASA PO   Dose:  1000 mg        Take 1,000 mg by mouth 2 times daily.   Refills:  0        vitamin D 1000 UNITS capsule   Dose:  1 capsule        Take 1 capsule by mouth daily.    Refills:  0        ZOLOFT 50 MG tablet   Generic drug:  sertraline        Take by mouth daily   Refills:  0                Procedures and tests performed during your visit     Procedure/Test Number of Times Performed    Basic metabolic panel 1    CBC with platelets differential 1    EKG 12-lead, tracing only 2    Troponin I 2    XR Chest 2 Views 1      Orders Needing Specimen Collection     None      Pending Results     No orders found from 12/9/2017 to 12/12/2017.            Pending Culture Results     No orders found from 12/9/2017 to 12/12/2017.            Pending Results Instructions     If you had any lab results that were not finalized at the time of your Discharge, you can call the ED Lab Result RN at [phone]. You will be contacted by this team for any positive Lab results or changes in treatment. The nurses are available 7 days a week from 10A to 6:30P.  You can leave a message 24 hours per day and they will return your call.        Test Results From Your Hospital Stay        12/11/2017 10:30 AM      Component Results     Component Value Ref Range & Units Status    WBC 4.2 4.0 - 11.0 10e9/L Final    RBC Count 4.46 3.8 - 5.2 10e12/L Final    Hemoglobin 12.2 11.7 - 15.7 g/dL Final    Hematocrit 37.6 35.0 - 47.0 % Final    MCV 84 78 - 100 fl Final    MCH 27.4 26.5 - 33.0 pg Final    MCHC 32.4 31.5 - 36.5 g/dL Final    RDW 13.9 10.0 - 15.0 % Final    Platelet Count 227 150 - 450 10e9/L Final    Diff Method Automated Method  Final    % Neutrophils 45.0 % Final    % Lymphocytes 47.9 % Final    % Monocytes 6.4 % Final    % Eosinophils 0.5 % Final    % Basophils 0.2 % Final    % Immature Granulocytes 0.0 % Final    Nucleated RBCs 0 0 /100 Final    Absolute Neutrophil 1.9 1.6 - 8.3 10e9/L Final    Absolute Lymphocytes 2.0 0.8 - 5.3 10e9/L Final    Absolute Monocytes 0.3 0.0 - 1.3 10e9/L Final    Absolute Eosinophils 0.0 0.0 - 0.7 10e9/L Final    Absolute Basophils 0.0 0.0 - 0.2 10e9/L Final    Abs Immature  Granulocytes 0.0 0 - 0.4 10e9/L Final    Absolute Nucleated RBC 0.0  Final         12/11/2017 10:46 AM      Component Results     Component Value Ref Range & Units Status    Sodium 140 133 - 144 mmol/L Final    Potassium 4.0 3.4 - 5.3 mmol/L Final    Chloride 107 94 - 109 mmol/L Final    Carbon Dioxide 27 20 - 32 mmol/L Final    Anion Gap 6 3 - 14 mmol/L Final    Glucose 82 70 - 99 mg/dL Final    Urea Nitrogen 12 7 - 30 mg/dL Final    Creatinine 0.64 0.52 - 1.04 mg/dL Final    GFR Estimate >90 >60 mL/min/1.7m2 Final    Non  GFR Calc    GFR Estimate If Black >90 >60 mL/min/1.7m2 Final    African American GFR Calc    Calcium 8.7 8.5 - 10.1 mg/dL Final         12/11/2017 10:50 AM      Component Results     Component Value Ref Range & Units Status    Troponin I ES <0.015 0.000 - 0.045 ug/L Final    The 99th percentile for upper reference range is 0.045 ug/L.  Troponin values   in the range of 0.045 - 0.120 ug/L may be associated with risks of adverse   clinical events.           12/11/2017 10:52 AM      Narrative     XR CHEST 2 VW 12/11/2017 10:40 AM    HISTORY: Chest pain.    COMPARISON: None.    FINDINGS: No airspace consolidation, effusion or pneumothorax. Normal  heart size.        Impression     IMPRESSION: No acute cardiopulmonary abnormality.    FARSHAD ASENCIO MD         12/11/2017  1:27 PM      Component Results     Component Value Ref Range & Units Status    Troponin I ES <0.015 0.000 - 0.045 ug/L Final    The 99th percentile for upper reference range is 0.045 ug/L.  Troponin values   in the range of 0.045 - 0.120 ug/L may be associated with risks of adverse   clinical events.                  Clinical Quality Measure: Blood Pressure Screening     Your blood pressure was checked while you were in the emergency department today. The last reading we obtained was  BP: 154/88 . Please read the guidelines below about what these numbers mean and what you should do about them.  If your systolic blood  "pressure (the top number) is less than 120 and your diastolic blood pressure (the bottom number) is less than 80, then your blood pressure is normal. There is nothing more that you need to do about it.  If your systolic blood pressure (the top number) is 120-139 or your diastolic blood pressure (the bottom number) is 80-89, your blood pressure may be higher than it should be. You should have your blood pressure rechecked within a year by a primary care provider.  If your systolic blood pressure (the top number) is 140 or greater or your diastolic blood pressure (the bottom number) is 90 or greater, you may have high blood pressure. High blood pressure is treatable, but if left untreated over time it can put you at risk for heart attack, stroke, or kidney failure. You should have your blood pressure rechecked by a primary care provider within the next 4 weeks.  If your provider in the emergency department today gave you specific instructions to follow-up with your doctor or provider even sooner than that, you should follow that instruction and not wait for up to 4 weeks for your follow-up visit.        Thank you for choosing [facility]       Thank you for choosing [facility] for your care. Our goal is always to provide you with excellent care. Hearing back from our patients is one way we can continue to improve our services. Please take a few minutes to complete the written survey that you may receive in the mail after you visit with us. Thank you!        [email or web address] Information     [employer] lets you send messages to your doctor, view your test results, renew your prescriptions, schedule appointments and more. To sign up, go to [email or web address] . Click on \"Log in\" on the left side of the screen, which will take you to the Welcome page. Then click on \"Sign up Now\" on the right side of the page.     You will be asked to enter the access code listed below, as well as some personal information. Please follow the " directions to create your username and password.     Your access code is: [email or web address]  Expires: 3/11/2018  2:00 PM     Your access code will  in 90 days. If you need help or a new code, please call your [facility] clinic or [phone].        Care EveryWhere ID     This is your Care EveryWhere ID. This could be used by other organizations to access your [facility] medical records  [id number]        Equal Access to Services     YOKASTA REDDY : Hadii gerson frasero Sohiral, waaxda luqadaha, qaybta kaalmada adeegyada, mely finney . So [facility] [phone].    ATENCIÓN: Si habla español, tiene a farmer disposición servicios gratuitos de asistencia lingüística. Llame al [phone].    We comply with applicable federal civil rights laws and [place] laws. We do not discriminate on the basis of race, color, national origin, age, disability, sex, sexual orientation, or gender identity.            After Visit Summary       This is your record. Keep this with you and show to your community pharmacist(s) and doctor(s) at your next visit.

## 2017-12-11 NOTE — ED NOTES
Bed: ED30  Expected date:   Expected time:   Means of arrival:   Comments:  allina - 57 F chest pain eta 0985

## 2017-12-11 NOTE — ED AVS SNAPSHOT
Emergency Department    [address] [place] [address]    Phone:  [phone]    Fax:  [phone]                                       Migue Cerna   MRN: [id number]    Department:   Emergency Department   Date of Visit:  12/11/2017           After Visit Summary Signature Page     I have received my discharge instructions, and my questions have been answered. I have discussed any challenges I see with this plan with the nurse or doctor.    ..........................................................................................................................................  Patient/Patient Representative Signature      ..........................................................................................................................................  Patient Representative Print Name and Relationship to Patient    ..................................................               ................................................  Date                                            Time    ..........................................................................................................................................  Reviewed by Signature/Title    ...................................................              ..............................................  Date                                                            Time

## 2017-12-11 NOTE — DISCHARGE INSTRUCTIONS
*CHEST PAIN, UNCERTAIN CAUSE    Based on your exam today, the exact cause of your chest pain is not certain. Your condition does not seem serious at this time, and your pain does not appear to be coming from your heart. However, sometimes the signs of a serious problem take more time to appear. Therefore, watch for the warning signs listed below.  HOME CARE:  1. Rest today and avoid strenuous activity.  2. Take any prescribed medicine as directed.  FOLLOW UP with your doctor in 1-3 days.   GET PROMPT MEDICAL ATTENTION if any of the following occur:    A change in the type of pain: if it feels different, becomes more severe, lasts longer, or begins to spread into your shoulder, arm, neck, jaw or back    Shortness of breath or increased pain with breathing    Weakness, dizziness, or fainting    Cough with blood or dark colored sputum (phlegm)    Fever over 101  F (38.3  C)    Swelling, pain or redness in one leg    [id number] [employer]. [address] [place] [address]. All rights reserved. This information is not intended as a substitute for professional medical care. Always follow your healthcare professional's instructions.  This information has been modified by your health care provider with permission from the publisher.    Discharge Instructions  Chest Pain    You have been seen today for chest pain or discomfort.  At this time, your doctor has found no signs that your chest pain is due to a serious or life-threatening condition, (or you have declined more testing and/or admission to the hospital). However, sometimes there is a serious problem that does not show up right away. Your evaluation today may not be complete and you may need further testing and evaluation.     You need to follow-up with your regular doctor within 3 days.    Return to the Emergency Department if:    Your chest pain changes, gets worse, starts to happen more often, or comes with less activity.    You are short  of breath.    You get very weak or tired.    You pass out or faint.    You have any new symptoms, like fever, cough, numb legs, or you cough up blood.    You have anything else that worries you.    Until you follow-up with your regular doctor please do the following:    Take one aspirin daily unless you have an allergy or are told not to by your doctor.    If a stress test appointment has been made, go to the appointment.    If you have questions, contact your regular doctor.    If your doctor today has told you to follow-up with your regular doctor, it is very important that you make an appointment with your clinic and go to the appointment.  If you do not follow-up with your primary doctor, it may result in missing an important development which could result in permanent injury or disability and/or lasting pain.  If there is any problem keeping your appointment, call your doctor or return to the Emergency Department.    If you were given a prescription for medicine here today, be sure to read all of the information (including the package insert) that comes with your prescription.  This will include important information about the medicine, its side effects, and any warnings that you need to know about.  The pharmacist who fills the prescription can provide more information and answer questions you may have about the medicine.  If you have questions or concerns that the pharmacist cannot address, please call or return to the Emergency Department.     Opioid Medication Information    Pain medications are among the most commonly prescribed medicines, so we are including this information for all our patients. If you did not receive pain medication or get a prescription for pain medicine, you can ignore it.     You may have been given a prescription for an opioid (narcotic) pain medicine and/or have received a pain medicine while here in the Emergency Department. These medicines can make you drowsy or impaired. You  must not drive, operate dangerous equipment, or engage in any other dangerous activities while taking these medications. If you drive while taking these medications, you could be arrested for DUI, or driving under the influence. Do not drink any alcohol while you are taking these medications.     Opioid pain medications can cause addiction. If you have a history of chemical dependency of any type, you are at a higher risk of becoming addicted to pain medications.  Only take these prescribed medications to treat your pain when all other options have been tried. Take it for as short a time and as few doses as possible. Store your pain pills in a secure place, as they are frequently stolen and provide a dangerous opportunity for children or visitors in your house to start abusing these powerful medications. We will not replace any lost or stolen medicine.  As soon as your pain is better, you should flush all your remaining medication.     Many prescription pain medications contain Tylenol  (acetaminophen), including Vicodin , Tylenol #3 , Norco , Lortab , and Percocet .  You should not take any extra pills of Tylenol  if you are using these prescription medications or you can get very sick.  Do not ever take more than 3000 mg of acetaminophen in any 24 hour period.    All opioids tend to cause constipation. Drink plenty of water and eat foods that have a lot of fiber, such as fruits, vegetables, prune juice, apple juice and high fiber cereal.  Take a laxative if you don t move your bowels at least every other day. Miralax , Milk of Magnesia, Colace , or Senna  can be used to keep you regular.      Remember that you can always come back to the Emergency Department if you are not able to see your regular doctor in the amount of time listed above, if you get any new symptoms, or if there is anything that worries you.